# Patient Record
Sex: MALE | Race: WHITE | Employment: FULL TIME | URBAN - METROPOLITAN AREA
[De-identification: names, ages, dates, MRNs, and addresses within clinical notes are randomized per-mention and may not be internally consistent; named-entity substitution may affect disease eponyms.]

---

## 2017-03-15 ENCOUNTER — HOSPITAL ENCOUNTER (EMERGENCY)
Age: 37
Discharge: HOME | End: 2017-03-15
Attending: EMERGENCY MEDICINE
Payer: COMMERCIAL

## 2017-03-15 ENCOUNTER — APPOINTMENT (OUTPATIENT)
Dept: GENERAL RADIOLOGY | Age: 37
End: 2017-03-15
Payer: COMMERCIAL

## 2017-03-15 VITALS
DIASTOLIC BLOOD PRESSURE: 78 MMHG | TEMPERATURE: 98.8 F | OXYGEN SATURATION: 97 % | WEIGHT: 181 LBS | RESPIRATION RATE: 16 BRPM | BODY MASS INDEX: 25.34 KG/M2 | SYSTOLIC BLOOD PRESSURE: 132 MMHG | HEIGHT: 71 IN | HEART RATE: 81 BPM

## 2017-03-15 DIAGNOSIS — R07.9 CHEST PAIN, UNSPECIFIED TYPE: Primary | ICD-10-CM

## 2017-03-15 LAB
ABSOLUTE EOS #: 0 K/UL (ref 0–0.4)
ABSOLUTE LYMPH #: 1.1 K/UL (ref 1–4.8)
ABSOLUTE MONO #: 0.4 K/UL (ref 0.1–1.3)
ALBUMIN SERPL-MCNC: 4.7 G/DL (ref 3.5–5.2)
ALBUMIN/GLOBULIN RATIO: ABNORMAL (ref 1–2.5)
ALP BLD-CCNC: 80 U/L (ref 40–129)
ALT SERPL-CCNC: 17 U/L (ref 5–41)
ANION GAP SERPL CALCULATED.3IONS-SCNC: 16 MMOL/L (ref 9–17)
AST SERPL-CCNC: 17 U/L
BASOPHILS # BLD: 0 % (ref 0–2)
BASOPHILS ABSOLUTE: 0 K/UL (ref 0–0.2)
BILIRUB SERPL-MCNC: 0.68 MG/DL (ref 0.3–1.2)
BUN BLDV-MCNC: 7 MG/DL (ref 6–20)
BUN/CREAT BLD: ABNORMAL (ref 9–20)
CALCIUM SERPL-MCNC: 9.8 MG/DL (ref 8.6–10.4)
CHLORIDE BLD-SCNC: 103 MMOL/L (ref 98–107)
CO2: 23 MMOL/L (ref 20–31)
CREAT SERPL-MCNC: 0.68 MG/DL (ref 0.7–1.2)
DIFFERENTIAL TYPE: ABNORMAL
EOSINOPHILS RELATIVE PERCENT: 0 % (ref 0–4)
GFR AFRICAN AMERICAN: >60 ML/MIN
GFR NON-AFRICAN AMERICAN: >60 ML/MIN
GFR SERPL CREATININE-BSD FRML MDRD: ABNORMAL ML/MIN/{1.73_M2}
GFR SERPL CREATININE-BSD FRML MDRD: ABNORMAL ML/MIN/{1.73_M2}
GLUCOSE BLD-MCNC: 86 MG/DL (ref 70–99)
HCT VFR BLD CALC: 52.5 % (ref 41–53)
HEMOGLOBIN: 18 G/DL (ref 13.5–17.5)
LIPASE: 46 U/L (ref 13–60)
LYMPHOCYTES # BLD: 9 % (ref 24–44)
MCH RBC QN AUTO: 28.5 PG (ref 26–34)
MCHC RBC AUTO-ENTMCNC: 34.3 G/DL (ref 31–37)
MCV RBC AUTO: 83.2 FL (ref 80–100)
MONOCYTES # BLD: 3 % (ref 1–7)
PDW BLD-RTO: 14.3 % (ref 11.5–14.9)
PLATELET # BLD: 245 K/UL (ref 150–450)
PLATELET ESTIMATE: ABNORMAL
PMV BLD AUTO: 8.1 FL (ref 6–12)
POTASSIUM SERPL-SCNC: 3.9 MMOL/L (ref 3.7–5.3)
RBC # BLD: 6.31 M/UL (ref 4.5–5.9)
RBC # BLD: ABNORMAL 10*6/UL
SEG NEUTROPHILS: 88 % (ref 36–66)
SEGMENTED NEUTROPHILS ABSOLUTE COUNT: 11 K/UL (ref 1.3–9.1)
SODIUM BLD-SCNC: 142 MMOL/L (ref 135–144)
TOTAL PROTEIN: 7.6 G/DL (ref 6.4–8.3)
TROPONIN INTERP: NORMAL
TROPONIN T: <0.03 NG/ML
WBC # BLD: 12.6 K/UL (ref 3.5–11)
WBC # BLD: ABNORMAL 10*3/UL

## 2017-03-15 PROCEDURE — 83690 ASSAY OF LIPASE: CPT

## 2017-03-15 PROCEDURE — 84484 ASSAY OF TROPONIN QUANT: CPT

## 2017-03-15 PROCEDURE — 99285 EMERGENCY DEPT VISIT HI MDM: CPT

## 2017-03-15 PROCEDURE — 71020 XR CHEST STANDARD TWO VW: CPT

## 2017-03-15 PROCEDURE — 85025 COMPLETE CBC W/AUTO DIFF WBC: CPT

## 2017-03-15 PROCEDURE — 36415 COLL VENOUS BLD VENIPUNCTURE: CPT

## 2017-03-15 PROCEDURE — 93005 ELECTROCARDIOGRAM TRACING: CPT

## 2017-03-15 PROCEDURE — 80053 COMPREHEN METABOLIC PANEL: CPT

## 2017-03-15 RX ORDER — OMEPRAZOLE 20 MG/1
20 CAPSULE, DELAYED RELEASE ORAL DAILY
Qty: 14 CAPSULE | Refills: 0 | Status: SHIPPED | OUTPATIENT
Start: 2017-03-15 | End: 2020-11-16

## 2017-03-15 ASSESSMENT — ENCOUNTER SYMPTOMS
COUGH: 0
RESPIRATORY NEGATIVE: 1
ABDOMINAL PAIN: 0
GASTROINTESTINAL NEGATIVE: 1
EYES NEGATIVE: 1
BACK PAIN: 0
SHORTNESS OF BREATH: 0

## 2017-03-16 LAB
EKG ATRIAL RATE: 83 BPM
EKG P AXIS: 39 DEGREES
EKG P-R INTERVAL: 180 MS
EKG Q-T INTERVAL: 366 MS
EKG QRS DURATION: 106 MS
EKG QTC CALCULATION (BAZETT): 430 MS
EKG R AXIS: 76 DEGREES
EKG T AXIS: 78 DEGREES
EKG VENTRICULAR RATE: 83 BPM

## 2019-09-05 ENCOUNTER — APPOINTMENT (OUTPATIENT)
Dept: GENERAL RADIOLOGY | Age: 39
End: 2019-09-05
Payer: COMMERCIAL

## 2019-09-05 ENCOUNTER — HOSPITAL ENCOUNTER (EMERGENCY)
Age: 39
Discharge: HOME | End: 2019-09-05
Attending: EMERGENCY MEDICINE
Payer: COMMERCIAL

## 2019-09-05 VITALS
HEART RATE: 100 BPM | HEIGHT: 71 IN | BODY MASS INDEX: 21 KG/M2 | DIASTOLIC BLOOD PRESSURE: 85 MMHG | OXYGEN SATURATION: 94 % | WEIGHT: 150 LBS | SYSTOLIC BLOOD PRESSURE: 119 MMHG | RESPIRATION RATE: 16 BRPM | TEMPERATURE: 98.8 F

## 2019-09-05 DIAGNOSIS — J40 BRONCHITIS: Primary | ICD-10-CM

## 2019-09-05 PROCEDURE — 99283 EMERGENCY DEPT VISIT LOW MDM: CPT

## 2019-09-05 PROCEDURE — 94640 AIRWAY INHALATION TREATMENT: CPT

## 2019-09-05 PROCEDURE — 6370000000 HC RX 637 (ALT 250 FOR IP): Performed by: EMERGENCY MEDICINE

## 2019-09-05 PROCEDURE — 71046 X-RAY EXAM CHEST 2 VIEWS: CPT

## 2019-09-05 PROCEDURE — 94761 N-INVAS EAR/PLS OXIMETRY MLT: CPT

## 2019-09-05 RX ORDER — IPRATROPIUM BROMIDE AND ALBUTEROL SULFATE 2.5; .5 MG/3ML; MG/3ML
1 SOLUTION RESPIRATORY (INHALATION) PRN
Status: DISCONTINUED | OUTPATIENT
Start: 2019-09-05 | End: 2019-09-05 | Stop reason: HOSPADM

## 2019-09-05 RX ORDER — ALBUTEROL SULFATE 90 UG/1
2 AEROSOL, METERED RESPIRATORY (INHALATION) EVERY 4 HOURS PRN
Qty: 1 INHALER | Refills: 0 | Status: SHIPPED | OUTPATIENT
Start: 2019-09-05 | End: 2020-11-16

## 2019-09-05 RX ORDER — BENZONATATE 100 MG/1
100 CAPSULE ORAL 3 TIMES DAILY PRN
Qty: 30 CAPSULE | Refills: 0 | Status: SHIPPED | OUTPATIENT
Start: 2019-09-05 | End: 2019-09-15

## 2019-09-05 RX ORDER — BENZONATATE 100 MG/1
100 CAPSULE ORAL ONCE
Status: COMPLETED | OUTPATIENT
Start: 2019-09-05 | End: 2019-09-05

## 2019-09-05 RX ADMIN — BENZONATATE 100 MG: 100 CAPSULE ORAL at 20:11

## 2019-09-05 RX ADMIN — IPRATROPIUM BROMIDE AND ALBUTEROL SULFATE 1 AMPULE: .5; 3 SOLUTION RESPIRATORY (INHALATION) at 20:10

## 2019-09-05 ASSESSMENT — ENCOUNTER SYMPTOMS
COUGH: 1
RHINORRHEA: 1
COLOR CHANGE: 0
CHEST TIGHTNESS: 1
TROUBLE SWALLOWING: 0
BACK PAIN: 0
SORE THROAT: 0
NAUSEA: 0
EYE PAIN: 0
ABDOMINAL PAIN: 0
EYE DISCHARGE: 0
DIARRHEA: 0
SINUS PRESSURE: 0
CONSTIPATION: 0
VOMITING: 0
EYE REDNESS: 0
BLOOD IN STOOL: 0
WHEEZING: 0
FACIAL SWELLING: 0
SHORTNESS OF BREATH: 0

## 2019-09-06 NOTE — ED PROVIDER NOTES
[address]  eMERGENCY dEPARTMENT eNCOUnter      Pt Name: Kristin Cox  MRN: [id number]  [place] 1980  Date of evaluation: 9/5/19      CHIEF COMPLAINT       Chief Complaint   Patient presents with    Cough    Nasal Congestion         HISTORY OF PRESENT ILLNESS    Kristin Cox is a 43 y.o. male who presents complaining of cough. Patient states for the last 5 days has had cough with nasal congestion and runny nose. Patient states his cough is productive of yellow to greenish sputum. Patient has had no fevers at home. Patient states that when he coughs really hard he does get some pain in his lower sternal area. Patient has no abdominal pain nausea vomiting or diarrhea. Patient has had no pain or swelling in the legs. Patient is a smoker but has no history of asthma or COPD. Patient has had no recent ill contacts. REVIEW OF SYSTEMS       Review of Systems   Constitutional: Negative for activity change, appetite change, chills, diaphoresis and fever. HENT: Positive for congestion and rhinorrhea. Negative for ear pain, facial swelling, nosebleeds, sinus pressure, sore throat and trouble swallowing. Eyes: Negative for pain, discharge and redness. Respiratory: Positive for cough and chest tightness. Negative for shortness of breath and wheezing. Cardiovascular: Positive for chest pain. Negative for palpitations and leg swelling. Gastrointestinal: Negative for abdominal pain, blood in stool, constipation, diarrhea, nausea and vomiting. Genitourinary: Negative for difficulty urinating, dysuria, flank pain, frequency, genital sores and hematuria. Musculoskeletal: Negative for arthralgias, back pain, gait problem, joint swelling, myalgias and neck pain. Skin: Negative for color change, pallor, rash and wound. Neurological: Negative for dizziness, tremors, seizures, syncope, speech difficulty, weakness, numbness and headaches.    Psychiatric/Behavioral: Negative for confusion, decreased concentration, hallucinations, self-injury, sleep disturbance and suicidal ideas. PAST MEDICAL HISTORY   History reviewed. No pertinent past medical history. SURGICAL HISTORY     History reviewed. No pertinent surgical history. CURRENT MEDICATIONS       Previous Medications    OMEPRAZOLE (PRILOSEC) 20 MG DELAYED RELEASE CAPSULE    Take 1 capsule by mouth daily       ALLERGIES     has No Known Allergies. SOCIAL HISTORY      reports that he has been smoking cigars. He has been smoking about 1.00 pack per day. He has never used smokeless tobacco.    PHYSICAL EXAM     INITIAL VITALS: /85   Pulse 100   Temp 98.8 °F (37.1 °C) (Oral)   Resp 16   Ht 5' 11\" (1.803 m)   Wt 150 lb (68 kg)   SpO2 94%   BMI 20.92 kg/m²      Physical Exam   Constitutional: He is oriented to person, place, and time. He appears well-developed and well-nourished. No distress. HENT:   Head: Normocephalic and atraumatic. Eyes: Pupils are equal, round, and reactive to light. Conjunctivae and EOM are normal. Right eye exhibits no discharge. Left eye exhibits no discharge. No scleral icterus. Cardiovascular: Normal rate, regular rhythm and normal heart sounds. Exam reveals no gallop and no friction rub. No murmur heard. Pulmonary/Chest: Effort normal. No respiratory distress. He has wheezes. He has rhonchi. He has no rales. He exhibits no tenderness. Abdominal: Soft. Bowel sounds are normal. He exhibits no distension and no mass. There is no tenderness. There is no rebound and no guarding. Musculoskeletal: Normal range of motion. He exhibits no edema or tenderness. Neurological: He is alert and oriented to person, place, and time. He displays normal reflexes. No cranial nerve deficit or sensory deficit. He exhibits normal muscle tone. Coordination normal.   Skin: Skin is warm and dry. No rash noted. He is not diaphoretic. No erythema. No pallor.    Psychiatric: He has a normal mood and MCG/ACT inhaler     Sig: Inhale 2 puffs into the lungs every 4 hours as needed for Wheezing or Shortness of Breath (Space out to every 6 hours as symptoms improve) Space out to every 6 hours as symptoms improve. Dispense:  1 Inhaler     Refill:  0       -------------------------  9:07 PM  Patient was reevaluated and updated. We will put him on an inhaler but no steroids as he does not feel like the treatment helped him much. I think this is most likely viral so we will not put him on antibiotics at this time. CONSULTS:  None    PROCEDURES:  None    FINAL IMPRESSION      1. Bronchitis          DISPOSITION/PLAN   DISPOSITION Decision To Discharge 09/05/2019 09:06:52 PM      PATIENT REFERREDTO:  Jayesh Lima MD  [address] [place] [address]  [phone]    Schedule an appointment as soon as possible for a visit in 1 week  Follow up within 1 week, Return to ED sooner if symptoms worsen,     [facility] ED  [place] [address]  [phone]    If symptoms worsen      DISCHARGEMEDICATIONS:  New Prescriptions    ALBUTEROL SULFATE HFA (PROVENTIL HFA) 108 (90 BASE) MCG/ACT INHALER    Inhale 2 puffs into the lungs every 4 hours as needed for Wheezing or Shortness of Breath (Space out to every 6 hours as symptoms improve) Space out to every 6 hours as symptoms improve.     BENZONATATE (TESSALON PERLES) 100 MG CAPSULE    Take 1 capsule by mouth 3 times daily as needed for Cough       (Please note that portions of this note were completed with a voice recognition program.  Efforts were made to edit thedictations but occasionally words are mis-transcribed.)    Jackelyn Moses MD  Attending Emergency Physician                        Jackelyn Moses MD  09/05/19 2098

## 2020-11-17 PROBLEM — M54.42 CHRONIC LEFT-SIDED LOW BACK PAIN WITH LEFT-SIDED SCIATICA: Status: ACTIVE | Noted: 2020-11-17

## 2020-11-17 PROBLEM — Z87.19 HISTORY OF BARRETT'S ESOPHAGUS: Status: ACTIVE | Noted: 2020-11-17

## 2020-11-17 PROBLEM — G89.29 CHRONIC LEFT-SIDED LOW BACK PAIN WITH LEFT-SIDED SCIATICA: Status: ACTIVE | Noted: 2020-11-17

## 2020-11-17 PROBLEM — F17.200 HEAVY SMOKER: Status: ACTIVE | Noted: 2020-11-17

## 2020-11-17 PROBLEM — Z98.890: Status: ACTIVE | Noted: 2020-11-17

## 2021-01-24 PROBLEM — M48.061 SPINAL STENOSIS OF LUMBAR REGION: Status: ACTIVE | Noted: 2021-01-24

## 2021-01-24 PROBLEM — M51.36 LUMBAR DEGENERATIVE DISC DISEASE: Status: ACTIVE | Noted: 2021-01-24

## 2021-01-24 PROBLEM — M46.1 SACROILIITIS, NOT ELSEWHERE CLASSIFIED (HCC): Status: ACTIVE | Noted: 2021-01-24

## 2021-01-24 PROBLEM — M51.36 ANNULAR TEAR OF LUMBAR DISC: Status: ACTIVE | Noted: 2021-01-24

## 2021-02-01 ENCOUNTER — HOSPITAL ENCOUNTER (OUTPATIENT)
Age: 41
Setting detail: SPECIMEN
Discharge: HOME | End: 2021-02-01
Payer: COMMERCIAL

## 2021-02-01 DIAGNOSIS — M48.061 SPINAL STENOSIS OF LUMBAR REGION, UNSPECIFIED WHETHER NEUROGENIC CLAUDICATION PRESENT: ICD-10-CM

## 2021-02-01 DIAGNOSIS — G89.29 CHRONIC LEFT-SIDED LOW BACK PAIN WITH LEFT-SIDED SCIATICA: ICD-10-CM

## 2021-02-01 DIAGNOSIS — M54.42 CHRONIC LEFT-SIDED LOW BACK PAIN WITH LEFT-SIDED SCIATICA: ICD-10-CM

## 2021-02-01 DIAGNOSIS — M51.36 LUMBAR DEGENERATIVE DISC DISEASE: ICD-10-CM

## 2021-02-01 LAB
ALBUMIN SERPL-MCNC: 4.2 G/DL (ref 3.5–5.2)
ALBUMIN/GLOBULIN RATIO: 1.6 (ref 1–2.5)
ALP BLD-CCNC: 67 U/L (ref 40–129)
ALT SERPL-CCNC: 18 U/L (ref 5–41)
ANION GAP SERPL CALCULATED.3IONS-SCNC: 10 MMOL/L (ref 9–17)
AST SERPL-CCNC: 17 U/L
BILIRUB SERPL-MCNC: 0.25 MG/DL (ref 0.3–1.2)
BUN BLDV-MCNC: 11 MG/DL (ref 6–20)
BUN/CREAT BLD: ABNORMAL (ref 9–20)
CALCIUM SERPL-MCNC: 8.9 MG/DL (ref 8.6–10.4)
CHLORIDE BLD-SCNC: 107 MMOL/L (ref 98–107)
CO2: 25 MMOL/L (ref 20–31)
CREAT SERPL-MCNC: 0.84 MG/DL (ref 0.7–1.2)
GFR AFRICAN AMERICAN: >60 ML/MIN
GFR NON-AFRICAN AMERICAN: >60 ML/MIN
GFR SERPL CREATININE-BSD FRML MDRD: ABNORMAL ML/MIN/{1.73_M2}
GFR SERPL CREATININE-BSD FRML MDRD: ABNORMAL ML/MIN/{1.73_M2}
GLUCOSE BLD-MCNC: 121 MG/DL (ref 70–99)
POTASSIUM SERPL-SCNC: 4.2 MMOL/L (ref 3.7–5.3)
SODIUM BLD-SCNC: 142 MMOL/L (ref 135–144)
TOTAL PROTEIN: 6.8 G/DL (ref 6.4–8.3)

## 2021-04-26 ENCOUNTER — HOSPITAL ENCOUNTER (OUTPATIENT)
Age: 41
Setting detail: SPECIMEN
Discharge: HOME | End: 2021-04-26
Payer: COMMERCIAL

## 2021-04-26 DIAGNOSIS — G89.29 CHRONIC LEFT-SIDED LOW BACK PAIN WITH LEFT-SIDED SCIATICA: ICD-10-CM

## 2021-04-26 DIAGNOSIS — M54.42 CHRONIC LEFT-SIDED LOW BACK PAIN WITH LEFT-SIDED SCIATICA: ICD-10-CM

## 2021-04-26 DIAGNOSIS — R52 PAIN MANAGEMENT: ICD-10-CM

## 2021-04-26 DIAGNOSIS — M51.36 ANNULAR TEAR OF LUMBAR DISC: ICD-10-CM

## 2021-04-26 DIAGNOSIS — M51.36 LUMBAR DEGENERATIVE DISC DISEASE: ICD-10-CM

## 2021-04-26 LAB
AMPHETAMINE SCREEN URINE: NEGATIVE
BARBITURATE SCREEN URINE: NEGATIVE
BENZODIAZEPINE SCREEN, URINE: NEGATIVE
BUPRENORPHINE URINE: ABNORMAL
CANNABINOID SCREEN URINE: NEGATIVE
COCAINE METABOLITE, URINE: NEGATIVE
MDMA URINE: ABNORMAL
METHADONE SCREEN, URINE: POSITIVE
METHAMPHETAMINE, URINE: ABNORMAL
OPIATES, URINE: NEGATIVE
OXYCODONE SCREEN URINE: NEGATIVE
PHENCYCLIDINE, URINE: NEGATIVE
PROPOXYPHENE, URINE: ABNORMAL
TEST INFORMATION: ABNORMAL
TRICYCLIC ANTIDEPRESSANTS, UR: ABNORMAL

## 2021-08-11 ENCOUNTER — HOSPITAL ENCOUNTER (OUTPATIENT)
Age: 41
Setting detail: SPECIMEN
Discharge: HOME | End: 2021-08-11
Payer: COMMERCIAL

## 2021-08-11 DIAGNOSIS — G89.29 CHRONIC LEFT-SIDED LOW BACK PAIN WITH LEFT-SIDED SCIATICA: ICD-10-CM

## 2021-08-11 DIAGNOSIS — M51.36 LUMBAR DEGENERATIVE DISC DISEASE: ICD-10-CM

## 2021-08-11 DIAGNOSIS — M54.42 CHRONIC LEFT-SIDED LOW BACK PAIN WITH LEFT-SIDED SCIATICA: ICD-10-CM

## 2021-08-11 DIAGNOSIS — M48.061 SPINAL STENOSIS OF LUMBAR REGION, UNSPECIFIED WHETHER NEUROGENIC CLAUDICATION PRESENT: ICD-10-CM

## 2021-08-15 LAB
6-ACETYLMORPHINE, UR: NOT DETECTED
7-AMINOCLONAZEPAM, URINE: NOT DETECTED
ALPHA-OH-ALPRAZ, URINE: NOT DETECTED
ALPHA-OH-MIDAZOLAM, URINE: NOT DETECTED
ALPRAZOLAM, URINE: NOT DETECTED
AMPHETAMINES, URINE: NOT DETECTED
BARBITURATES, URINE: NOT DETECTED
BENZOYLECGONINE, UR: NOT DETECTED
BUPRENORPHINE URINE: NOT DETECTED
CARISOPRODOL, UR: NOT DETECTED
CLONAZEPAM, URINE: NOT DETECTED
CODEINE, URINE: NOT DETECTED
CREATININE URINE: 149.4 MG/DL (ref 20–400)
DIAZEPAM, URINE: NOT DETECTED
DRUGS EXPECTED, UR: NORMAL
EER HI RES INTERP UR: NORMAL
ETHYL GLUCURONIDE UR: NOT DETECTED
FENTANYL URINE: NOT DETECTED
GABAPENTIN: NOT DETECTED
HYDROCODONE, URINE: NOT DETECTED
HYDROMORPHONE, URINE: NOT DETECTED
LORAZEPAM, URINE: NOT DETECTED
MARIJUANA METAB, UR: NOT DETECTED
MDA, UR: NOT DETECTED
MDEA, EVE, UR: NOT DETECTED
MDMA URINE: NOT DETECTED
MEPERIDINE METAB, UR: NOT DETECTED
METHADONE, URINE: NOT DETECTED
METHAMPHETAMINE, URINE: NOT DETECTED
METHYLPHENIDATE: NOT DETECTED
MIDAZOLAM, URINE: NOT DETECTED
MORPHINE URINE: NOT DETECTED
NALOXONE URINE: NOT DETECTED
NORBUPRENORPHINE, URINE: NOT DETECTED
NORDIAZEPAM, URINE: NOT DETECTED
NORFENTANYL, URINE: NOT DETECTED
NORHYDROCODONE, URINE: NOT DETECTED
NOROXYCODONE, URINE: NOT DETECTED
NOROXYMORPHONE, URINE: NOT DETECTED
OXAZEPAM, URINE: NOT DETECTED
OXYCODONE URINE: NOT DETECTED
OXYMORPHONE, URINE: NOT DETECTED
PAIN MANAGEMENT DRUG PANEL INTERP, URINE: NORMAL
PAIN MGT DRUG PANEL, HI RES, UR: NORMAL
PCP,URINE: NOT DETECTED
PHENTERMINE, UR: NOT DETECTED
PREGABALIN: NOT DETECTED
TAPENTADOL, URINE: NOT DETECTED
TAPENTADOL-O-SULFATE, URINE: NOT DETECTED
TEMAZEPAM, URINE: NOT DETECTED
TRAMADOL, URINE: NOT DETECTED
ZOLPIDEM METABOLITE (ZCA), URINE: NOT DETECTED
ZOLPIDEM, URINE: NOT DETECTED

## 2022-03-06 ENCOUNTER — HOSPITAL ENCOUNTER (INPATIENT)
Age: 42
LOS: 1 days | Discharge: HOME | DRG: 918 | End: 2022-03-08
Attending: EMERGENCY MEDICINE | Admitting: INTERNAL MEDICINE
Payer: COMMERCIAL

## 2022-03-06 DIAGNOSIS — T39.1X2A INTENTIONAL ACETAMINOPHEN OVERDOSE, INITIAL ENCOUNTER (HCC): Primary | ICD-10-CM

## 2022-03-06 PROCEDURE — 80179 DRUG ASSAY SALICYLATE: CPT

## 2022-03-06 PROCEDURE — 36415 COLL VENOUS BLD VENIPUNCTURE: CPT

## 2022-03-06 PROCEDURE — 80048 BASIC METABOLIC PNL TOTAL CA: CPT

## 2022-03-06 PROCEDURE — 96365 THER/PROPH/DIAG IV INF INIT: CPT

## 2022-03-06 PROCEDURE — 80076 HEPATIC FUNCTION PANEL: CPT

## 2022-03-06 PROCEDURE — G0480 DRUG TEST DEF 1-7 CLASSES: HCPCS

## 2022-03-06 PROCEDURE — 99285 EMERGENCY DEPT VISIT HI MDM: CPT

## 2022-03-06 PROCEDURE — 93005 ELECTROCARDIOGRAM TRACING: CPT | Performed by: EMERGENCY MEDICINE

## 2022-03-06 PROCEDURE — 80143 DRUG ASSAY ACETAMINOPHEN: CPT

## 2022-03-06 RX ORDER — 0.9 % SODIUM CHLORIDE 0.9 %
1000 INTRAVENOUS SOLUTION INTRAVENOUS ONCE
Status: COMPLETED | OUTPATIENT
Start: 2022-03-06 | End: 2022-03-07

## 2022-03-06 ASSESSMENT — PAIN - FUNCTIONAL ASSESSMENT: PAIN_FUNCTIONAL_ASSESSMENT: 0-10

## 2022-03-06 ASSESSMENT — PAIN SCALES - GENERAL: PAINLEVEL_OUTOF10: 5

## 2022-03-06 NOTE — LETTER
[address] [facility] [address]  Phone: [facility], [place]      March 8, 2022     Patient: Mera Platt   YOB: 1980   Date of Visit: 3/6/2022       To Whom It May Concern: It is my medical opinion that Matt Bingham may return to full duty immediately with no restrictions on 3/9/2022. If you have any questions or concerns, please don't hesitate to call.     Sincerely,        Kina Vogel MD

## 2022-03-07 PROBLEM — T39.1X2A TYLENOL OVERDOSE, INTENTIONAL SELF-HARM, INITIAL ENCOUNTER (HCC): Status: ACTIVE | Noted: 2022-03-07

## 2022-03-07 PROBLEM — F32.A DEPRESSION: Status: ACTIVE | Noted: 2022-03-07

## 2022-03-07 LAB
ABSOLUTE EOS #: 0 K/UL (ref 0–0.4)
ABSOLUTE EOS #: 0 K/UL (ref 0–0.4)
ABSOLUTE LYMPH #: 1.9 K/UL (ref 1–4.8)
ABSOLUTE LYMPH #: 2.5 K/UL (ref 1–4.8)
ABSOLUTE MONO #: 0.7 K/UL (ref 0.1–1.3)
ABSOLUTE MONO #: 0.8 K/UL (ref 0.1–1.3)
ACETAMINOPHEN LEVEL: 6 UG/ML (ref 10–30)
ACETAMINOPHEN LEVEL: <5 UG/ML (ref 10–30)
ALBUMIN SERPL-MCNC: 3.8 G/DL (ref 3.5–5.2)
ALBUMIN SERPL-MCNC: 4.3 G/DL (ref 3.5–5.2)
ALP BLD-CCNC: 59 U/L (ref 40–129)
ALP BLD-CCNC: 72 U/L (ref 40–129)
ALT SERPL-CCNC: 42 U/L (ref 5–41)
ALT SERPL-CCNC: 42 U/L (ref 5–41)
ALT SERPL-CCNC: 48 U/L (ref 5–41)
ANION GAP SERPL CALCULATED.3IONS-SCNC: 10 MMOL/L (ref 9–17)
ANION GAP SERPL CALCULATED.3IONS-SCNC: 13 MMOL/L (ref 9–17)
AST SERPL-CCNC: 21 U/L
AST SERPL-CCNC: 25 U/L
AST SERPL-CCNC: 27 U/L
BASOPHILS # BLD: 0 % (ref 0–2)
BASOPHILS # BLD: 0 % (ref 0–2)
BASOPHILS ABSOLUTE: 0 K/UL (ref 0–0.2)
BASOPHILS ABSOLUTE: 0.1 K/UL (ref 0–0.2)
BILIRUB SERPL-MCNC: 0.35 MG/DL (ref 0.3–1.2)
BILIRUB SERPL-MCNC: 0.36 MG/DL (ref 0.3–1.2)
BILIRUBIN DIRECT: 0.11 MG/DL
BILIRUBIN, INDIRECT: 0.25 MG/DL (ref 0–1)
BUN BLDV-MCNC: 10 MG/DL (ref 6–20)
BUN BLDV-MCNC: 10 MG/DL (ref 6–20)
CALCIUM SERPL-MCNC: 8.2 MG/DL (ref 8.6–10.4)
CALCIUM SERPL-MCNC: 8.8 MG/DL (ref 8.6–10.4)
CHLORIDE BLD-SCNC: 106 MMOL/L (ref 98–107)
CHLORIDE BLD-SCNC: 108 MMOL/L (ref 98–107)
CO2: 20 MMOL/L (ref 20–31)
CO2: 21 MMOL/L (ref 20–31)
CREAT SERPL-MCNC: 0.58 MG/DL (ref 0.7–1.2)
CREAT SERPL-MCNC: 0.74 MG/DL (ref 0.7–1.2)
EKG ATRIAL RATE: 94 BPM
EKG P AXIS: 60 DEGREES
EKG P-R INTERVAL: 160 MS
EKG Q-T INTERVAL: 352 MS
EKG QRS DURATION: 104 MS
EKG QTC CALCULATION (BAZETT): 440 MS
EKG R AXIS: 65 DEGREES
EKG T AXIS: 67 DEGREES
EKG VENTRICULAR RATE: 94 BPM
EOSINOPHILS RELATIVE PERCENT: 0 % (ref 0–4)
EOSINOPHILS RELATIVE PERCENT: 0 % (ref 0–4)
ETHANOL PERCENT: <0.01 %
ETHANOL: <10 MG/DL
GFR AFRICAN AMERICAN: >60 ML/MIN
GFR AFRICAN AMERICAN: >60 ML/MIN
GFR NON-AFRICAN AMERICAN: >60 ML/MIN
GFR NON-AFRICAN AMERICAN: >60 ML/MIN
GFR SERPL CREATININE-BSD FRML MDRD: ABNORMAL ML/MIN/{1.73_M2}
GFR SERPL CREATININE-BSD FRML MDRD: NORMAL ML/MIN/{1.73_M2}
GLUCOSE BLD-MCNC: 126 MG/DL (ref 70–99)
GLUCOSE BLD-MCNC: 75 MG/DL (ref 70–99)
HCT VFR BLD CALC: 47.5 % (ref 41–53)
HCT VFR BLD CALC: 51.3 % (ref 41–53)
HEMOGLOBIN: 16.2 G/DL (ref 13.5–17.5)
HEMOGLOBIN: 17.4 G/DL (ref 13.5–17.5)
LYMPHOCYTES # BLD: 15 % (ref 24–44)
LYMPHOCYTES # BLD: 17 % (ref 24–44)
MAGNESIUM: 1.9 MG/DL (ref 1.6–2.6)
MAGNESIUM: 2 MG/DL (ref 1.6–2.6)
MCH RBC QN AUTO: 28.9 PG (ref 26–34)
MCH RBC QN AUTO: 29.2 PG (ref 26–34)
MCHC RBC AUTO-ENTMCNC: 33.9 G/DL (ref 31–37)
MCHC RBC AUTO-ENTMCNC: 34.1 G/DL (ref 31–37)
MCV RBC AUTO: 85.2 FL (ref 80–100)
MCV RBC AUTO: 85.5 FL (ref 80–100)
MONOCYTES # BLD: 5 % (ref 1–7)
MONOCYTES # BLD: 6 % (ref 1–7)
PDW BLD-RTO: 13.8 % (ref 11.5–14.9)
PDW BLD-RTO: 14.1 % (ref 11.5–14.9)
PLATELET # BLD: 231 K/UL (ref 150–450)
PLATELET # BLD: 261 K/UL (ref 150–450)
PMV BLD AUTO: 6.7 FL (ref 6–12)
PMV BLD AUTO: 7 FL (ref 6–12)
POTASSIUM SERPL-SCNC: 3.4 MMOL/L (ref 3.7–5.3)
POTASSIUM SERPL-SCNC: 4 MMOL/L (ref 3.7–5.3)
RBC # BLD: 5.56 M/UL (ref 4.5–5.9)
RBC # BLD: 6.02 M/UL (ref 4.5–5.9)
SALICYLATE LEVEL: <1 MG/DL (ref 3–10)
SARS-COV-2, RAPID: NOT DETECTED
SEG NEUTROPHILS: 78 % (ref 36–66)
SEG NEUTROPHILS: 79 % (ref 36–66)
SEGMENTED NEUTROPHILS ABSOLUTE COUNT: 10.1 K/UL (ref 1.3–9.1)
SEGMENTED NEUTROPHILS ABSOLUTE COUNT: 11.2 K/UL (ref 1.3–9.1)
SODIUM BLD-SCNC: 138 MMOL/L (ref 135–144)
SODIUM BLD-SCNC: 140 MMOL/L (ref 135–144)
SPECIMEN DESCRIPTION: NORMAL
TOTAL PROTEIN: 6 G/DL (ref 6.4–8.3)
TOTAL PROTEIN: 7 G/DL (ref 6.4–8.3)
WBC # BLD: 12.8 K/UL (ref 3.5–11)
WBC # BLD: 14.5 K/UL (ref 3.5–11)

## 2022-03-07 PROCEDURE — 2580000003 HC RX 258: Performed by: EMERGENCY MEDICINE

## 2022-03-07 PROCEDURE — 83735 ASSAY OF MAGNESIUM: CPT

## 2022-03-07 PROCEDURE — 6360000002 HC RX W HCPCS: Performed by: EMERGENCY MEDICINE

## 2022-03-07 PROCEDURE — 99291 CRITICAL CARE FIRST HOUR: CPT | Performed by: INTERNAL MEDICINE

## 2022-03-07 PROCEDURE — 84450 TRANSFERASE (AST) (SGOT): CPT

## 2022-03-07 PROCEDURE — 85025 COMPLETE CBC W/AUTO DIFF WBC: CPT

## 2022-03-07 PROCEDURE — 87635 SARS-COV-2 COVID-19 AMP PRB: CPT

## 2022-03-07 PROCEDURE — 6370000000 HC RX 637 (ALT 250 FOR IP)

## 2022-03-07 PROCEDURE — 2060000000 HC ICU INTERMEDIATE R&B

## 2022-03-07 PROCEDURE — 80053 COMPREHEN METABOLIC PANEL: CPT

## 2022-03-07 PROCEDURE — 80143 DRUG ASSAY ACETAMINOPHEN: CPT

## 2022-03-07 PROCEDURE — 6370000000 HC RX 637 (ALT 250 FOR IP): Performed by: STUDENT IN AN ORGANIZED HEALTH CARE EDUCATION/TRAINING PROGRAM

## 2022-03-07 PROCEDURE — 93010 ELECTROCARDIOGRAM REPORT: CPT | Performed by: INTERNAL MEDICINE

## 2022-03-07 PROCEDURE — 84460 ALANINE AMINO (ALT) (SGPT): CPT

## 2022-03-07 PROCEDURE — 36415 COLL VENOUS BLD VENIPUNCTURE: CPT

## 2022-03-07 RX ORDER — SODIUM CHLORIDE 0.9 % (FLUSH) 0.9 %
5-40 SYRINGE (ML) INJECTION EVERY 12 HOURS SCHEDULED
Status: DISCONTINUED | OUTPATIENT
Start: 2022-03-07 | End: 2022-03-08 | Stop reason: HOSPADM

## 2022-03-07 RX ORDER — POTASSIUM CHLORIDE 20 MEQ/1
40 TABLET, EXTENDED RELEASE ORAL PRN
Status: DISCONTINUED | OUTPATIENT
Start: 2022-03-07 | End: 2022-03-08 | Stop reason: HOSPADM

## 2022-03-07 RX ORDER — SODIUM CHLORIDE 9 MG/ML
25 INJECTION, SOLUTION INTRAVENOUS PRN
Status: DISCONTINUED | OUTPATIENT
Start: 2022-03-07 | End: 2022-03-08 | Stop reason: HOSPADM

## 2022-03-07 RX ORDER — POTASSIUM CHLORIDE 7.45 MG/ML
10 INJECTION INTRAVENOUS PRN
Status: DISCONTINUED | OUTPATIENT
Start: 2022-03-07 | End: 2022-03-08 | Stop reason: HOSPADM

## 2022-03-07 RX ORDER — LIDOCAINE 4 G/G
1 PATCH TOPICAL DAILY
Status: DISCONTINUED | OUTPATIENT
Start: 2022-03-07 | End: 2022-03-08 | Stop reason: HOSPADM

## 2022-03-07 RX ORDER — ONDANSETRON 4 MG/1
4 TABLET, ORALLY DISINTEGRATING ORAL EVERY 8 HOURS PRN
Status: DISCONTINUED | OUTPATIENT
Start: 2022-03-07 | End: 2022-03-08 | Stop reason: HOSPADM

## 2022-03-07 RX ORDER — MAGNESIUM SULFATE 1 G/100ML
1000 INJECTION INTRAVENOUS PRN
Status: DISCONTINUED | OUTPATIENT
Start: 2022-03-07 | End: 2022-03-08 | Stop reason: HOSPADM

## 2022-03-07 RX ORDER — SODIUM CHLORIDE 0.9 % (FLUSH) 0.9 %
5-40 SYRINGE (ML) INJECTION PRN
Status: DISCONTINUED | OUTPATIENT
Start: 2022-03-07 | End: 2022-03-08 | Stop reason: HOSPADM

## 2022-03-07 RX ORDER — NICOTINE 21 MG/24HR
1 PATCH, TRANSDERMAL 24 HOURS TRANSDERMAL DAILY
Status: DISCONTINUED | OUTPATIENT
Start: 2022-03-07 | End: 2022-03-08 | Stop reason: HOSPADM

## 2022-03-07 RX ORDER — ONDANSETRON 2 MG/ML
4 INJECTION INTRAMUSCULAR; INTRAVENOUS EVERY 6 HOURS PRN
Status: DISCONTINUED | OUTPATIENT
Start: 2022-03-07 | End: 2022-03-08 | Stop reason: HOSPADM

## 2022-03-07 RX ADMIN — ACETYLCYSTEINE 10200 MG: 6 INJECTION, SOLUTION INTRAVENOUS at 01:52

## 2022-03-07 RX ADMIN — ACETYLCYSTEINE 6800 MG: 6 INJECTION, SOLUTION INTRAVENOUS at 07:28

## 2022-03-07 RX ADMIN — ACETYLCYSTEINE 3400 MG: 6 INJECTION, SOLUTION INTRAVENOUS at 03:04

## 2022-03-07 RX ADMIN — SODIUM CHLORIDE, PRESERVATIVE FREE 10 ML: 5 INJECTION INTRAVENOUS at 20:52

## 2022-03-07 RX ADMIN — POTASSIUM CHLORIDE 40 MEQ: 1500 TABLET, EXTENDED RELEASE ORAL at 13:28

## 2022-03-07 RX ADMIN — ENOXAPARIN SODIUM 40 MG: 100 INJECTION SUBCUTANEOUS at 09:31

## 2022-03-07 RX ADMIN — SODIUM CHLORIDE, PRESERVATIVE FREE 10 ML: 5 INJECTION INTRAVENOUS at 09:32

## 2022-03-07 RX ADMIN — SODIUM CHLORIDE 1000 ML: 9 INJECTION, SOLUTION INTRAVENOUS at 00:14

## 2022-03-07 ASSESSMENT — PAIN DESCRIPTION - LOCATION: LOCATION: BACK

## 2022-03-07 ASSESSMENT — PAIN SCALES - GENERAL
PAINLEVEL_OUTOF10: 0
PAINLEVEL_OUTOF10: 4
PAINLEVEL_OUTOF10: 0

## 2022-03-07 ASSESSMENT — ENCOUNTER SYMPTOMS
SHORTNESS OF BREATH: 0
COUGH: 0
DIARRHEA: 0
VOMITING: 0
ABDOMINAL DISTENTION: 0
ABDOMINAL PAIN: 0
CHEST TIGHTNESS: 0
BACK PAIN: 1

## 2022-03-07 NOTE — ED PROVIDER NOTES
EMERGENCY DEPARTMENT ENCOUNTER    Pt Name: Katelin Pollock  MRN: [id number]  [place] 1980  Date of evaluation: 3/6/22  CHIEF COMPLAINT       Chief Complaint   Patient presents with    Drug Overdose     HISTORY OF PRESENT ILLNESS   HPI     Patient is a 42year old male who presents with tylenol overdose. Took 7 500mg acetaminophen tablets at midnight today. States he did this because he has a slipped disc and he was tired of living in pain. Stopped his antidepressant medication recently and has been feeling depressed. Denies alcohol use. Denies abdominal pain, nausea, vomiting. REVIEW OF SYSTEMS     Review of Systems   Constitutional: Negative for fever. HENT: Negative for congestion. Respiratory: Negative for cough and shortness of breath. Cardiovascular: Negative for chest pain. Gastrointestinal: Negative for abdominal pain, diarrhea and vomiting. Genitourinary: Negative for dysuria. Musculoskeletal: Positive for back pain. Skin: Negative for rash. Neurological: Negative for headaches. Psychiatric/Behavioral: Positive for dysphoric mood and suicidal ideas. All other systems reviewed and are negative. PASTMEDICAL HISTORY   History reviewed. No pertinent past medical history. SURGICAL HISTORY     History reviewed. No pertinent surgical history. CURRENT MEDICATIONS       Previous Medications    BUPROPION (WELLBUTRIN XL) 300 MG EXTENDED RELEASE TABLET    Take 1 tablet by mouth every morning    BUPROPION (ZYBAN) 150 MG EXTENDED RELEASE TABLET    Take 1 tablet by mouth 2 times daily    PANTOPRAZOLE (PROTONIX) 20 MG TABLET    Take 1 tablet by mouth nightly     ALLERGIES     has No Known Allergies. FAMILY HISTORY     has no family status information on file.       SOCIAL HISTORY       Social History     Tobacco Use    Smoking status: Current Every Day Smoker     Packs/day: 1.00     Years: 5.00     Pack years: 5.00     Types: Cigars, Cigarettes    Smokeless tobacco: Never Used Vaping Use    Vaping Use: Never used   Substance Use Topics    Alcohol use: Yes     Comment: social    Drug use: Never     PHYSICAL EXAM     INITIAL VITALS: /84   Pulse 84   Temp 98.2 °F (36.8 °C) (Oral)   Resp 29   Ht 5' 11\" (1.803 m)   Wt 150 lb (68 kg)   SpO2 95%   BMI 20.92 kg/m²    Physical Exam  Vitals and nursing note reviewed. Constitutional:       General: He is not in acute distress. Appearance: He is well-developed. HENT:      Head: Normocephalic and atraumatic. Eyes:      Conjunctiva/sclera: Conjunctivae normal.   Cardiovascular:      Rate and Rhythm: Normal rate and regular rhythm. Heart sounds: No murmur heard. No friction rub. Pulmonary:      Effort: Pulmonary effort is normal. No respiratory distress. Breath sounds: Normal breath sounds. No stridor. No wheezing or rhonchi. Abdominal:      General: There is no distension. Palpations: Abdomen is soft. Tenderness: There is no abdominal tenderness. There is no guarding or rebound. Musculoskeletal:      Cervical back: Neck supple. Skin:     General: Skin is warm and dry. Capillary Refill: Capillary refill takes less than 2 seconds. Neurological:      Mental Status: He is alert. Psychiatric:      Comments: Flat affect       DIAGNOSTIC RESULTS   RADIOLOGY:All plain film, CT, MRI, and formal ultrasound images (except ED bedside ultrasound) are read by the radiologist, see reports below, unless otherwisenoted in MDM or here. No orders to display     LABS: All lab results were reviewed by myself, and all abnormals are listed below.   Labs Reviewed   CBC WITH AUTO DIFFERENTIAL - Abnormal; Notable for the following components:       Result Value    WBC 14.5 (*)     RBC 6.02 (*)     Seg Neutrophils 78 (*)     Lymphocytes 17 (*)     Segs Absolute 11.20 (*)     All other components within normal limits   ACETAMINOPHEN LEVEL - Abnormal; Notable for the following components:    Acetaminophen Level 6 (*)     All other components within normal limits   SALICYLATE LEVEL - Abnormal; Notable for the following components:    Salicylate Lvl <1 (*)     All other components within normal limits   HEPATIC FUNCTION PANEL - Abnormal; Notable for the following components:    ALT 48 (*)     All other components within normal limits   COVID-19, RAPID   BASIC METABOLIC PANEL   MAGNESIUM   ETHANOL       EMERGENCY DEPARTMENTCOURSE:   Differential diagnosis includes acute toxicity, exacerbation of chronic mental illness, liver failure. Patient reports taking 3500mg of tylenol in a 24 hour period, however will check labs. 1:54 AM EST  Patient has mildly elevated ALT of 48 no other elevation in LFTs. Ethanol salicylate negative. Tylenol level is 6 I did speak to [facility] control about this and they said that because there is still is a detectable Tylenol level after 24 hours she recommends starting the patient on NAC. Will admit the patient for Tylenol overdose I did speak to Dr. Lorena Khan who accepts the patient. EKG: All EKG's are interpreted by the Emergency Department Physician who either signs or Co-signs this chart in the absence of a cardiologist.  Normal sinus rhythm with a heart rate of 94 bpm, normal axis, no prolonged intervals no acute ST or T wave changes    CRITICAL CARE:   CRITICAL CARE: There was a high probability of clinically significant/life threatening deterioration in this patient's condition which required my urgent intervention. Total critical care time was 15 minutes. This excludes any time for separately reportable procedures.        CONSULTS:  IP CONSULT TO FAMILY MEDICINE    Vitals:    Vitals:    03/06/22 2334 03/06/22 2353 03/07/22 0115 03/07/22 0123   BP: (!) 139/111 122/84     Pulse: 100 108 72 84   Resp: 15 12 25 29   Temp: 98.2 °F (36.8 °C)      TempSrc: Oral      SpO2: 97% 98% 95% 95%   Weight: 150 lb (68 kg)      Height: 5' 11\" (1.803 m)          The patient was given the following medications while in the emergency department:  Orders Placed This Encounter   Medications    0.9 % sodium chloride bolus    FOLLOWED BY Linked Order Group     acetylcysteine (ACETADOTE) 10,200 mg in dextrose 5 % 251 mL infusion     acetylcysteine (ACETADOTE) 3,400 mg in dextrose 5 % 500 mL infusion     acetylcysteine (ACETADOTE) 6,800 mg in dextrose 5 % 1,000 mL infusion         FINAL IMPRESSION      1.  Intentional acetaminophen overdose, initial encounter [facility])         Marlys Canseco MD  Attending Emergency Physician    This charting supersedes any ED resident or staff charting and was written using speech recognition Erasto Orellana MD  03/07/22 8456

## 2022-03-07 NOTE — ED TRIAGE NOTES
Mode of arrival (squad #, walk in, police, etc) : walk in        Chief complaint(s): Drug Overdose        Arrival Note (brief scenario, treatment PTA, etc). : pt. Reports he was having back pain due a slipped disc and intentionally took 7 of his 500 mg tylenol around 0000 3/6 (approx 12 hours PTA). Pt. States he has been off his dose of Wellbutrin for approx. 1 week and took the tylenol in a moment of weakness. Pt. Denies any previous attempts to harm himself. Pt. Denies any other complaints at this time. Pt. Denies any current SI or HI.        C= \"Have you ever felt that you should Cut down on your drinking? \"  No  A= \"Have people Annoyed you by criticizing your drinking? \"  No  G= \"Have you ever felt bad or Guilty about your drinking? \"  No  E= \"Have you ever had a drink as an Eye-opener first thing in the morning to steady your nerves or to help a hangover? \"  No      Deferred []      Reason for deferring: N/A    *If yes to two or more: probable alcohol abuse. *

## 2022-03-07 NOTE — PLAN OF CARE
Nutrition Problem #1: Inadequate oral intake  Intervention: Food and/or Nutrient Delivery: Continue Current Diet,Start Oral Nutrition Supplement  Nutritional Goals: po intake greater than 75%

## 2022-03-07 NOTE — PROGRESS NOTES
Comprehensive Nutrition Assessment    Type and Reason for Visit:  Initial,Positive Nutrition Screen (wt loss, poor appetite)    Nutrition Recommendations/Plan:   Will continue to provide Regular diet and add Ensure Enlive twice daily    Nutrition Assessment:  Pt admitted due to OD. He states he's not much of a breakfast eater but his appetite is back to normal. Review of wt history shows no wt loss. He is agreeable to trying Ensure. Malnutrition Assessment:  Malnutrition Status: At risk for malnutrition (Comment)    Context:  Acute Illness     Findings of the 6 clinical characteristics of malnutrition:  Energy Intake:  Mild decrease in energy intake (Comment)  Weight Loss:  No significant weight loss     Body Fat Loss:  No significant body fat loss     Muscle Mass Loss:  No significant muscle mass loss    Fluid Accumulation:  No significant fluid accumulation     Strength:  Not Performed    Estimated Daily Nutrient Needs:  Energy (kcal): Arenac x 1.2= 2000 kcal; Weight Used for Energy Requirements:  Admission     Protein (g):  1.3g/kg= 100 g; Weight Used for Protein Requirements:  Ideal          Nutrition Related Findings:  no edema, Labs/Meds: Reviewed, PMH: None      Wounds:  None       Current Nutrition Therapies:    ADULT DIET; Regular    Anthropometric Measures:  · Height: 5' 11\" (180.3 cm)  · Current Body Weight: 157 lb (71.2 kg)   · Admission Body Weight: 157 lb (71.2 kg)    · Usual Body Weight: 154 lb (69.9 kg) (8/21)     · Ideal Body Weight: 172 lbs; BMI: 21.9  · BMI Categories: Normal Weight (BMI 18.5-24. 9)       Nutrition Diagnosis:   · Inadequate oral intake related to psychological cause or life stress as evidenced by poor intake prior to admission,intake 0-25%    Nutrition Interventions:   Food and/or Nutrient Delivery:  Continue Current Diet,Start Oral Nutrition Supplement  Nutrition Education/Counseling:  Education completed (discussed supplements)   Coordination of Nutrition Care: Continue to monitor while inpatient    Goals:  po intake greater than 75%       Nutrition Monitoring and Evaluation:   Food/Nutrient Intake Outcomes:  Food and Nutrient Intake,Supplement Intake  Physical Signs/Symptoms Outcomes:  Biochemical Data,GI Status,Fluid Status or Edema,Skin,Weight     Discharge Planning:    Continue current diet     Electronically signed by Ely Vásquez RD, LD on 3/7/22 at 12:24 PM EST    Contact: [phone]

## 2022-03-07 NOTE — CARE COORDINATION
CASE MANAGEMENT NOTE:    Admission Date:  3/6/2022 Whitney Espinal is a 42 y.o.  male    Admitted for : Intentional acetaminophen overdose, initial encounter ([facility] [address] [T39.1X2A]  Tylenol overdose, intentional self-harm, [facility] Heide [facility]ers    Met with:  Patient    PCP:  Has no PCP, but would like a PCP with [facility]. Insurance:  BCBS      Is patient alert and oriented at time of discussion:  Yes    Current Residence/ Living Arrangements:  independently at home             Current Services PTA:  Yes, follows with Dr Tanya Pena at [facility] clinic    Does patient go to outpatient dialysis: No  If yes, location and chair time:     Is patient agreeable to VNS: No    Freedom of choice provided:  Yes    List of [address] provided: No    VNS chosen:  No    DME:  none    Home Oxygen: No    Nebulizer: No    CPAP/BIPAP: No    Supplier: N/A    Potential Assistance Needed: Yes, May need BHI     SNF needed: No    Freedom of choice and list provided: NA    [facility]       Does Patient want to use MEDS to BEDS? No    Is patient currently receiving oral anticoagulation therapy? No    Is the Patient an [facility] with Readmission Risk Score greater than 14%? No  If yes, pt needs a follow up appointment made within 7 days. Family Members/Caregivers that pt would like involved in their care:    no    If yes, list name here:  none    Transportation Provider:  Will call someone for a ride             Discharge Plan:  3/7/22 - [employer] - Patient is from home alone, Has no DMe: Denies need for VNS, following with Dr Tanya Pena at [facility] for back pain. Has no PCP and wants to follow [facility], New PCP appt made with Garett Phillips, for 3/21/22 at 12:45p.m.,  [place] header risk 7% Probable discharge to [facility], follow for discharge needs. Will follow . //pf               Electronically signed by: Carlos Hope RN on 3/7/2022 at 2:40 PM

## 2022-03-07 NOTE — PLAN OF CARE
Problem: Suicide risk  Goal: Provide patient with safe environment  Description: Provide patient with safe environment  3/7/2022 0952 by Sanaz Ventura RN  Outcome: Ongoing  3/7/2022 0552 by Caleb Carpenter RN  Outcome: Ongoing     Problem: Falls - Risk of:  Goal: Will remain free from falls  Description: Will remain free from falls  3/7/2022 0952 by Sanaz Ventura RN  Outcome: Ongoing  3/7/2022 0552 by Caleb Carpenter RN  Outcome: Ongoing  Goal: Absence of physical injury  Description: Absence of physical injury  3/7/2022 0952 by Sanaz Ventura RN  Outcome: Ongoing  3/7/2022 0552 by Caleb Carpenter RN  Outcome: Ongoing

## 2022-03-07 NOTE — ED NOTES
Yadira Arthur is a 42year old male who presents to the ED via self. Pt ingested 6-7 500 mg Tylenol at midnight earlier today to help relieve pt's back pain. Pt reports this was not an attempt to kill himself. Pt denies active SI/HI/AH/VVH. When asked about pt answering \"yes\" for all of the suicide screening questions during triage, pt reports \"I don't remember being asked those questions. \" Pt does report on going stress at work that is causing pt some depression but states \"no more then usual.\" Pt denies any previous suicide attempt and psychiatric hospitalizations. Pt stopped taking Wellbutrin after 6 months to a year one week ago. Pt states this was prescribed to pt to help with pt's smoking sensation. Pt denies he use of illegal drugs and occasionaly drinks alcohol. Pt reports good sleep and a normal appetite. Pt does provide this writer with conflicting information. Pt informed ED Dr and RN pt was feeling depressed and having intermittent suicidal ideations. Pt to be reevaluated once pt is medically cleared.

## 2022-03-07 NOTE — ED NOTES
Pt placed on 1:1 supervision. Pt changed out. Pt calm and cooperative.      Lloyd, [facility], [facility]  03/07/22 0029

## 2022-03-07 NOTE — H&P
[address] [place] [address]     HISTORY AND PHYSICAL EXAMINATION            Date:   3/7/2022  Patient name:  Ariel Delgadillo  Date of admission:  3/6/2022 11:33 PM  MRN:   [id number]  Account:  [de-identified]  YOB: 1980  PCP:    No primary care provider on file. Room:   2011/2011-01  Code Status:    Full Code    Chief Complaint:     Chief Complaint   Patient presents with    Drug Overdose       History Obtained From:     patient    History of Present Illness: The patient is a 70-year-old male history of depression and slipped lumbar disc who presents with possible Tylenol overdose. Patient has history of lumbar slipped disc, for which she is scheduled to receive cortisone injection and which has been causing him significant pain over the past couple months. Pain was increasing while the patient was at work as a  on the evening of 3/6, so he took a total of 6-7 500 mg Tylenol tablets over the course of 7 hours. He went home sometime after midnight, and slept until he was awoken by nonradiating right upper quadrant pain, at which point he presented to the [address] [place] [address] ED. Patient does has a history of depression previously treated with Wellbutrin. Patient states that he stopped taking this medication approximately 1 week ago, but denies any suicidal/homicidal ideation. Emphasizes that this incident was not a suicide attempt. In the ED, patient was hypertensive /111, tachycardic , afebrile. Initial laboratory work-up significant for ALT/AST 48/27, acetaminophen 6, WBC 14.5. Initial work-up included:       EKG showing normal sinus rhythm    Patient was given 1 L fluid bolus and started on acetylcysteine infusion.   Decision was made to admit patient to intermediate ICU for management of possible acute Tylenol overdose. Past Medical History:     Past Medical History:   Diagnosis Date    Depression         Past SurgicalHistory:     History reviewed. No pertinent surgical history. Medications Prior to Admission:     Prior to Admission medications    Medication Sig Start Date End Date Taking? Authorizing Provider   buPROPion (WELLBUTRIN XL) 300 MG extended release tablet Take 1 tablet by mouth every morning 21   CLIFTON Barry CNP   buPROPion [facility]) 150 MG extended release tablet Take 1 tablet by mouth 2 times daily 21   CLIFTON Barry CNP   pantoprazole (PROTONIX) 20 MG tablet Take 1 tablet by mouth nightly 21   CLIFTON Barry CNP        Allergies:     Patient has no known allergies. Social History:     Tobacco:    reports that he has been smoking cigars and cigarettes. He has a 5.00 pack-year smoking history. He has never used smokeless tobacco.  Alcohol:      reports current alcohol use. Drug Use:  reports no history of drug use. Family History:     History reviewed. No pertinent family history. Review of Systems:     Review of Systems   Constitutional: Negative for chills and fever. Respiratory: Negative for cough, chest tightness and shortness of breath. Cardiovascular: Negative for chest pain and palpitations. Gastrointestinal: Negative for abdominal distention and abdominal pain. Genitourinary: Negative for difficulty urinating and dysuria. Musculoskeletal: Positive for back pain (Chronic). Neurological: Negative for light-headedness and headaches. Psychiatric/Behavioral: Negative for agitation and behavioral problems. Physical Exam:   BP (!) 142/85   Pulse 76   Temp 98 °F (36.7 °C) (Oral)   Resp 14   Ht 5' 11\" (1.803 m)   Wt 157 lb 10.1 oz (71.5 kg)   SpO2 98%   BMI 21.98 kg/m²   Temp (24hrs), Av.1 °F (36.7 °C), Min:98 °F (36.7 °C), Max:98.2 °F (36.8 °C)    No results for input(s): POCGLU in the last 72 hours.     Intake/Output Summary (Last 24 hours) at 3/7/2022 0839  Last data filed at 3/7/2022 0520  Gross per 24 hour   Intake --   Output 250 ml   Net -250 ml       Physical Exam  Constitutional:       Appearance: Normal appearance. HENT:      Head: Normocephalic and atraumatic. Eyes:      Extraocular Movements: Extraocular movements intact. Conjunctiva/sclera: Conjunctivae normal.   Cardiovascular:      Rate and Rhythm: Normal rate and regular rhythm. Heart sounds: Normal heart sounds. Pulmonary:      Effort: Pulmonary effort is normal. No respiratory distress. Breath sounds: Normal breath sounds. Abdominal:      General: Abdomen is flat. There is no distension. Palpations: Abdomen is soft. Tenderness: There is no abdominal tenderness. Musculoskeletal:         General: Normal range of motion. Right lower leg: No edema. Left lower leg: No edema. Skin:     General: Skin is warm. Neurological:      General: No focal deficit present. Mental Status: He is alert and oriented to person, place, and time. Psychiatric:         Mood and Affect: Mood normal.         Behavior: Behavior normal.       Investigations:     Laboratory Testing:  Recent Results (from the past 24 hour(s))   EKG 12 Lead    Collection Time: 03/06/22 11:37 PM   Result Value Ref Range    Ventricular Rate 94 BPM    Atrial Rate 94 BPM    P-R Interval 160 ms    QRS Duration 104 ms    Q-T Interval 352 ms    QTc Calculation (Bazett) 440 ms    P Axis 60 degrees    R Axis 65 degrees    T Axis 67 degrees   COVID-19, Rapid    Collection Time: 03/07/22 12:18 AM    Specimen: Nasopharyngeal Swab   Result Value Ref Range    Specimen Description . NASOPHARYNGEAL SWAB     SARS-CoV-2, Rapid Not Detected Not Detected       Imaging/Diagnostics:  No results found.     Assessment :      Primary Problem  Tylenol overdose, intentional self-harm, initial encounter [facility])    Active Hospital Problems    Diagnosis Date Noted    Tylenol overdose, intentional self-harm, initial encounter ([facility] Kristy Harvey 03/07/2022    Depression [T27. A] 03/07/2022    Lumbar degenerative disc disease [M51.36] 01/24/2021    Heavy smoker [F17.200] 11/17/2020     Plan:     Patient status Admit as inpatient in the  Medical ICU    Acetaminophen overdose, intentional vs unintentional  - Patient ingested approximately 3500 mg acetaminophen over the course of 7 hours  - Acetylcysteine infusion in process  - Suicide precautions, bedside sitter in place    Comorbid conditions  Lumbar DDD: Lidocaine patch 4%  Nicotine dependence: Nicotine patch 21 mg/day    DVT prophylaxis: Lovenox 40 mg daily  GI prophylaxis: None  Code: Full  Dispo: Likely transfer out of ICU today    Consultations:   IP CONSULT TO FAMILY MEDICINE    Patient is admitted as inpatient status because of co-morbidities listed above, severity of signs and symptoms as outlined, requirement for current medical therapies and most importantly because of direct risk to patient if care not provided in a hospital setting. Joe Lima MD  3/7/2022  8:39 AM  Attending Physician Statement    I have discussed the case of Jodie Samaniego, including pertinent history and exam findings with the resident. I have seen and examined the patient and the key elements of the encounter have been performed by me. I agree with the assessment, plan, and orders as documented by the resident. He had a slight elevation in ALT and AST which came back to normal.  He took a moderate dose of acetaminophen which was about 3 to 3.5 g. On my examination he was alert and responsive with no confusion and he has no evidence of right upper quadrant tenderness to suggest acute hepatomegaly.   Time more than 30 minutes  Electronically signed by Ольга Casey MD on 3/7/2022 at 4:46 PM

## 2022-03-08 VITALS
DIASTOLIC BLOOD PRESSURE: 76 MMHG | HEART RATE: 78 BPM | BODY MASS INDEX: 22.07 KG/M2 | OXYGEN SATURATION: 98 % | HEIGHT: 71 IN | SYSTOLIC BLOOD PRESSURE: 128 MMHG | RESPIRATION RATE: 18 BRPM | WEIGHT: 157.63 LBS | TEMPERATURE: 98.5 F

## 2022-03-08 LAB
ABSOLUTE EOS #: 0.1 K/UL (ref 0–0.4)
ABSOLUTE LYMPH #: 2.4 K/UL (ref 1–4.8)
ABSOLUTE MONO #: 0.9 K/UL (ref 0.1–1.3)
ALBUMIN SERPL-MCNC: 3.5 G/DL (ref 3.5–5.2)
ALP BLD-CCNC: 56 U/L (ref 40–129)
ALT SERPL-CCNC: 47 U/L (ref 5–41)
ANION GAP SERPL CALCULATED.3IONS-SCNC: 10 MMOL/L (ref 9–17)
AST SERPL-CCNC: 28 U/L
BASOPHILS # BLD: 1 % (ref 0–2)
BASOPHILS ABSOLUTE: 0.1 K/UL (ref 0–0.2)
BILIRUB SERPL-MCNC: 0.47 MG/DL (ref 0.3–1.2)
BUN BLDV-MCNC: 8 MG/DL (ref 6–20)
CALCIUM SERPL-MCNC: 8.1 MG/DL (ref 8.6–10.4)
CHLORIDE BLD-SCNC: 109 MMOL/L (ref 98–107)
CO2: 22 MMOL/L (ref 20–31)
CREAT SERPL-MCNC: 0.52 MG/DL (ref 0.7–1.2)
EOSINOPHILS RELATIVE PERCENT: 1 % (ref 0–4)
GFR AFRICAN AMERICAN: >60 ML/MIN
GFR NON-AFRICAN AMERICAN: >60 ML/MIN
GFR SERPL CREATININE-BSD FRML MDRD: ABNORMAL ML/MIN/{1.73_M2}
GLUCOSE BLD-MCNC: 95 MG/DL (ref 70–99)
HCT VFR BLD CALC: 46.1 % (ref 41–53)
HEMOGLOBIN: 15.7 G/DL (ref 13.5–17.5)
LYMPHOCYTES # BLD: 22 % (ref 24–44)
MCH RBC QN AUTO: 28.9 PG (ref 26–34)
MCHC RBC AUTO-ENTMCNC: 34 G/DL (ref 31–37)
MCV RBC AUTO: 85 FL (ref 80–100)
MONOCYTES # BLD: 8 % (ref 1–7)
PDW BLD-RTO: 13.9 % (ref 11.5–14.9)
PLATELET # BLD: 221 K/UL (ref 150–450)
PMV BLD AUTO: 7 FL (ref 6–12)
POTASSIUM SERPL-SCNC: 3.7 MMOL/L (ref 3.7–5.3)
RBC # BLD: 5.43 M/UL (ref 4.5–5.9)
SEG NEUTROPHILS: 68 % (ref 36–66)
SEGMENTED NEUTROPHILS ABSOLUTE COUNT: 7.3 K/UL (ref 1.3–9.1)
SODIUM BLD-SCNC: 141 MMOL/L (ref 135–144)
TOTAL PROTEIN: 5.5 G/DL (ref 6.4–8.3)
WBC # BLD: 10.8 K/UL (ref 3.5–11)

## 2022-03-08 PROCEDURE — 85025 COMPLETE CBC W/AUTO DIFF WBC: CPT

## 2022-03-08 PROCEDURE — 2580000003 HC RX 258: Performed by: EMERGENCY MEDICINE

## 2022-03-08 PROCEDURE — 36415 COLL VENOUS BLD VENIPUNCTURE: CPT

## 2022-03-08 PROCEDURE — 6360000002 HC RX W HCPCS: Performed by: EMERGENCY MEDICINE

## 2022-03-08 PROCEDURE — 99238 HOSP IP/OBS DSCHRG MGMT 30/<: CPT | Performed by: INTERNAL MEDICINE

## 2022-03-08 PROCEDURE — 6370000000 HC RX 637 (ALT 250 FOR IP)

## 2022-03-08 PROCEDURE — 80053 COMPREHEN METABOLIC PANEL: CPT

## 2022-03-08 RX ADMIN — SODIUM CHLORIDE, PRESERVATIVE FREE 10 ML: 5 INJECTION INTRAVENOUS at 08:11

## 2022-03-08 RX ADMIN — ENOXAPARIN SODIUM 40 MG: 100 INJECTION SUBCUTANEOUS at 08:07

## 2022-03-08 ASSESSMENT — ENCOUNTER SYMPTOMS
ABDOMINAL DISTENTION: 0
SHORTNESS OF BREATH: 0
CHEST TIGHTNESS: 0
ABDOMINAL PAIN: 0
COUGH: 0
BACK PAIN: 1

## 2022-03-08 ASSESSMENT — PAIN SCALES - GENERAL: PAINLEVEL_OUTOF10: 4

## 2022-03-08 NOTE — PROGRESS NOTES
[address] [facility] [employer]    PROGRESS NOTE             3/8/2022    7:30 AM    Name:   Lexi Nichole  MRN:     [id number]     Acct:      [de-identified]   Room:   2051/2051-01   Day:  1  Admit Date:  3/6/2022 11:33 PM    PCP:  No primary care provider on file. Code Status:  Full Code    Subjective:     C/C:   Chief Complaint   Patient presents with    Drug Overdose     Interval History Status: not changed. Patient seen and examined at bedside this morning. No acute events overnight, patient reports that right upper quadrant abdominal pain has resolved and that he is feeling well. Poison control contacted night of 3/7 with AST/ALT/acetaminophen level results, recommended no further acetylcysteine administration at this time. Anticipate discharge likely later today. Review of Systems:     Review of Systems   Constitutional: Negative for chills and fever. Respiratory: Negative for cough, chest tightness and shortness of breath. Cardiovascular: Negative for chest pain and palpitations. Gastrointestinal: Negative for abdominal distention and abdominal pain. Genitourinary: Negative for difficulty urinating and dysuria. Musculoskeletal: Positive for back pain (Chronic). Neurological: Negative for light-headedness and headaches. Psychiatric/Behavioral: Negative for agitation and behavioral problems. Medications:      Allergies:  No Known Allergies    Current Meds:   Scheduled Meds:    sodium chloride flush  5-40 mL IntraVENous 2 times per day    enoxaparin  40 mg SubCUTAneous Daily    nicotine  1 patch TransDERmal Daily    lidocaine  1 patch TransDERmal Daily     Continuous Infusions:    sodium chloride       PRN Meds: sodium chloride flush, sodium chloride, ondansetron **OR** ondansetron, potassium chloride **OR** potassium alternative oral replacement **OR** potassium chloride, magnesium sulfate    Data:     Past Medical History: has a past medical history of Depression. Social History:   reports that he has been smoking cigars and cigarettes. He has a 5.00 pack-year smoking history. He has never used smokeless tobacco. He reports current alcohol use. He reports that he does not use drugs. Family History: History reviewed. No pertinent family history. Vitals:  /75   Pulse 74   Temp 98.1 °F (36.7 °C)   Resp 18   Ht 5' 11\" (1.803 m)   Wt 157 lb 10.1 oz (71.5 kg)   SpO2 96%   BMI 21.98 kg/m²   Temp (24hrs), Av.3 °F (36.8 °C), Min:97.6 °F (36.4 °C), Max:98.9 °F (37.2 °C)    No results for input(s): POCGLU in the last 72 hours. I/O(24Hr): Intake/Output Summary (Last 24 hours) at 3/8/2022 0730  Last data filed at 3/7/2022 1753  Gross per 24 hour   Intake 1150.16 ml   Output --   Net 1150.16 ml       Labs:  [unfilled]    Lab Results   Component Value Date/Time    SPECIAL  10/13/2013 05:05 PM     RIGHT ARM 6 CC Performed at [address] [place] [address] [phone] 10/13/2013 05:05 PM     Lab Results   Component Value Date/Time    CULTURE NO GROWTH 6 DAYS 10/13/2013 05:05 PM    CULTURE  10/13/2013 05:05 PM     Performed at [address] [place] [address] [phone]       [facility]    Radiology:    No results found. Physical Examination:        Physical Exam  Constitutional:       Appearance: Normal appearance. HENT:      Head: Normocephalic and atraumatic. Eyes:      Extraocular Movements: Extraocular movements intact. Conjunctiva/sclera: Conjunctivae normal.   Cardiovascular:      Rate and Rhythm: Normal rate and regular rhythm. Heart sounds: Normal heart sounds. Pulmonary:      Effort: Pulmonary effort is normal. No respiratory distress. Breath sounds: Normal breath sounds. Abdominal:      General: Abdomen is flat. There is no distension. Palpations: Abdomen is soft. Tenderness:  There is no abdominal tenderness. Musculoskeletal:         General: Normal range of motion. Right lower leg: No edema. Left lower leg: No edema. Skin:     General: Skin is warm. Neurological:      General: No focal deficit present. Mental Status: He is alert and oriented to person, place, and time. Psychiatric:         Mood and Affect: Mood normal.         Behavior: Behavior normal.       Assessment:        Primary Problem  Tylenol overdose, intentional self-harm, initial encounter [facility])    Active Hospital Problems    Diagnosis Date Noted    Tylenol overdose, intentional self-harm, initial encounter ([facility] Kristy Wes 03/07/2022    Depression [S76. A] 03/07/2022    Intentional acetaminophen overdose ([facility] [T39.1X2A]     Lumbar degenerative disc disease [M51.36] 01/24/2021    Heavy smoker [F17.200] 11/17/2020       Plan:        Acetaminophen overdose, unintentional  - Patient ingested approximately 3500 mg acetaminophen over the course of 7 hours  - Acetylcysteine infusion completed  - Suicide precautions, bedside sitter in place     Comorbid conditions  Lumbar DDD: Lidocaine patch 4%  Nicotine dependence: Nicotine patch 21 mg/day     DVT prophylaxis: Lovenox 40 mg daily  GI prophylaxis: None  Code: Full  Dispo: Home today 3/8    Joe Lima MD  3/8/2022  7:30 AM

## 2022-03-08 NOTE — DISCHARGE SUMMARY
[address]    Discharge Summary     Patient ID: Grace Carroll  :  1980   MRN: [id number]     ACCOUNT:  [de-identified]   Patient's PCP: No primary care provider on file. Admit Date: 3/6/2022   Discharge Date: 3/8/2022   Length of Stay: 1  Code Status:  Prior  Admitting Physician: Sebastian Hopper MD  Discharge Physician: Analisa Luciano MD     Active Discharge Diagnoses:       Primary Problem  Acetaminophen overdose of undetermined intent      [place] Problems    Diagnosis Date Noted    [facility] overdose, [facility], [facility] Ionfferkamla Campbell 2022    Depression [F32. A] 2022    Acetaminophen overdose of undetermined intent [T39.1X4A]     Lumbar degenerative disc disease [M51.36] 2021    Heavy smoker [F17.200] 2020       Admission Condition:  fair     Discharged Condition: good    Hospital Stay:       Hospital Course:  Grace Carroll is a 58-year-old male history of depression and slipped lumbar disc who presented with a possible Tylenol overdose. He was determined that the patient had ingested 3500 mg of Tylenol over the course of 5 to 7 hours to alleviate chronic lower back pain, and experienced right upper quadrant abdominal pain afterwards. Patient was admitted to the ICU, where he received acetylcysteine infusion and IV hydration. ALT/AST remained mildly elevated and steady over the course of the admission. Acetaminophen level was 6. Patient denied any suicidal ideation associated with the incident. At the time of discharge patient is clinically and vitally stable.     Significant therapeutic interventions: Acetylcysteine infusion, IV hydration    Significant Diagnostic Studies:   Labs / Micro:  CBC:   Lab Results   Component Value Date    WBC 10.8 2022    RBC 5.43 2022    HGB 15.7 2022    HCT 46.1 2022    MCV 85.0 2022 Jodie Samaniego, including pertinent history and exam findings with the resident. I have seen and examined the patient and the key elements of the encounter have been performed by me. I agree with the assessment, plan, and orders as documented by the resident.   Patient is stable and can be discharged, he was asymptomatic at the time of discharge  Electronically signed by Ольга Casey MD on 3/8/2022 at 6:16 PM

## 2022-03-08 NOTE — PROGRESS NOTES
Patient transferred to [place] [address] room 2051 via wheelchair at this time. All personal belongings sent with patient.

## 2022-03-08 NOTE — PROGRESS NOTES
Patient to be transferred to room 2051 this morning. Report given to Nicki Ramos RN from Med Surg at this time.

## 2022-03-08 NOTE — DISCHARGE INSTR - COC
Continuity of Care Form    Patient Name: Nena Hendricks   :  1980  MRN:  [id number]    Admit date:  3/6/2022  Discharge date:  ***    Code Status Order: Full Code   Advance Directives:      Admitting Physician:  Mack Stock MD  PCP: No primary care provider on file. Discharging Nurse: [facility] Unit/Room#:   Discharging Unit Phone Number: ***    Emergency Contact:   Extended Emergency Contact Information  Primary Emergency Contact: Elvira Worthy  Address: [facility], [address] [place] [address]  Home Phone: [phone]  Relation: Parent    Past Surgical History:  History reviewed. No pertinent surgical history. 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 A    Intentional acetaminophen overdose (HCC) T39.1X2A       Isolation/Infection:   Isolation            No Isolation          Patient Infection Status       None to display            Nurse Assessment:  Last Vital Signs: /75   Pulse 74   Temp 98.1 °F (36.7 °C)   Resp 18   Ht 5' 11\" (1.803 m)   Wt 157 lb 10.1 oz (71.5 kg)   SpO2 96%   BMI 21.98 kg/m²     Last documented pain score (0-10 scale): Pain Level: 4  Last Weight:   Wt Readings from Last 1 Encounters:   22 157 lb 10.1 oz (71.5 kg)     Mental Status:  {IP PT MENTAL STATUS:}    IV Access:  { WILFRED IV ACCESS:101058394}    Nursing Mobility/ADLs:  Walking   {CHP DME KMKX:917990235}  Transfer  {CHP DME ESJU:671303482}  Bathing  {CHP DME IATH:254785492}  Dressing  {CHP DME YUUN:286363520}  Toileting  {CHP DME CEFL:492648899}  Feeding  {CHP DME MLYJ:987551875}  Med Admin  {CHP DME XFTO:041857333}  Med Delivery   { WILFRED MED Delivery:170016188}    Wound Care Documentation and Therapy:        Elimination:  Continence: Bowel: {YES / BA:38177}  Bladder: {YES / HF:11776}  Urinary Catheter: {Urinary Catheter:916316302}   Colostomy/Ileostomy/Ileal Conduit: {YES / FH:87846}       Date of Last BM: ***    Intake/Output Summary (Last 24 hours) at 3/8/2022 1148  Last data filed at 3/7/2022 1753  Gross per 24 hour   Intake 910.16 ml   Output --   Net 910.16 ml     I/O last 3 completed shifts:   In: 1150.2 [P.O.:480; IV Piggyback:670.2]  Out: 250 [Urine:250]    Safety Concerns:     [employer] Safety Concerns:816740363}    Impairments/Disabilities:      [employer] Impairments/Disabilities:797516460}    Nutrition Therapy:  Current Nutrition Therapy:   [employer] Diet List:251339703}    Routes of Feeding: {CHP DME Other Feedings:065664572}  Liquids: {Slp liquid thickness:95459}  Daily Fluid Restriction: {CHP DME Yes amt example:156242643}  Last Modified Barium Swallow with Video (Video Swallowing Test): {Done Not Done TWDF:102947789}    Treatments at the Time of Hospital Discharge:   Respiratory Treatments: ***  Oxygen Therapy:  {Therapy; copd oxygen:52035}  Ventilator:    { CC Vent ZWDZ:899703377}    Rehab Therapies: {THERAPEUTIC INTERVENTION:6301045749}  Weight Bearing Status/Restrictions: Pebbles JIMENEZ Weight Bearin}  Other Medical Equipment (for information only, NOT a DME order):  {EQUIPMENT:493353159}  Other Treatments: ***    Patient's personal belongings (please select all that are sent with patient):  {CHMORRIS DME Belongings:359877755}    RN SIGNATURE:  {Esignature:667914490}    CASE MANAGEMENT/SOCIAL WORK SECTION    Inpatient Status Date: ***    Readmission Risk Assessment Score:  Readmission Risk              Risk of Unplanned Readmission:  8           Discharging to Facility/ Agency   Name:   Address:  Phone:  Fax:    Dialysis Facility (if applicable)   Name:  Address:  Dialysis Schedule:  Phone:  Fax:    / signature: {Esignature:179028122}    PHYSICIAN SECTION    Prognosis: {Prognosis:0363410902}    Condition at Discharge: [address] [place] [address] Patient Condition:171795623}    Rehab Potential (if transferring to Rehab): {Prognosis:9732698321}    Recommended Labs or Other Treatments After Discharge: ***    Physician Certification: I certify the above information and transfer of Lexi Nichole  is necessary for the continuing treatment of the diagnosis listed and that he requires {Admit to Appropriate Level of Care:90136} for {GREATER/LESS:752480373} 30 days.      Update Admission H&P: {CHP DME Changes in NKNWB:047035707}    PHYSICIAN SIGNATURE:  {Esignature:240738355}

## 2022-03-08 NOTE — PROGRESS NOTES
Poison Control calls at this time for most updated AST/ALT/Acetaminophen lab results. Writer provided these results and per poison control, no further Acetylcysteine will be needed at this time.

## 2022-03-08 NOTE — PROGRESS NOTES
Pt removed IV his self, writer made sure they were removed. Which I educated him on the risk of that. Writer gave pt discharge paperwork, pt understand and states no further questions. Pt wants to walk down to his car in the ER.

## 2022-03-08 NOTE — FLOWSHEET NOTE
03/08/22 1118   Encounter Summary   Services provided to: Patient   Referral/Consult From: Rounding   Continue Visiting   (3-8-22)   Complexity of Encounter Low   Length of Encounter 15 minutes   Spiritual Assessment Completed Yes   Spiritual/Pentecostal   Type Spiritual support   Assessment Calm; Approachable;Coping   Intervention Active listening;Explored feelings, thoughts, concerns;Prayer;Sustaining presence/ Ministry of presence   Outcome Expressed gratitude;Engaged in conversation;Coping

## 2022-03-08 NOTE — CARE COORDINATION
ONGOING DISCHARGE PLAN:    Patient is alert and oriented x4. Spoke with patient regarding discharge plan and patient confirms that plan is still to return to home alone. Denies VNS/Needs. Sitter at the bedside. Follows w/ Dr. Cortes Garcia, at [facility]. Anticipate DC today w/ no needs. Will continue to follow for additional discharge needs.     Electronically signed by Wong Conner RN on 3/8/2022 at 10:49 AM

## 2022-03-08 NOTE — PROGRESS NOTES
Carlos Boyer called to check in on pt however he was not on the emergency contact list so writer went into the room to ask Doc Guerra if it was okay that I give information to B[place]. Doc Guerra states it was okay to give [employer].

## 2022-03-09 ENCOUNTER — CARE COORDINATION (OUTPATIENT)
Dept: CASE MANAGEMENT | Age: 42
End: 2022-03-09

## 2022-03-09 DIAGNOSIS — T39.1X4A ACETAMINOPHEN OVERDOSE OF UNDETERMINED INTENT, INITIAL ENCOUNTER: Primary | ICD-10-CM

## 2022-03-09 NOTE — CARE COORDINATION
[facility] Transitions Initial Follow Up Call    Call within 2 business days of discharge: Yes    Patient: Lulu Cruz Patient : 1980   MRN: <[id number]>  Reason for Admission: ntentional acetaminophen overdose, initial encounter   Discharge Date: 3/8/22 RARS: Readmission Risk Score: 6.4 ( )      Last Discharge [facility]       Complaint Diagnosis Description Type Department Provider    3/6/22 Drug Overdose Intentional acetaminophen overdose, initial encounter [facility]) ED to Hosp-Admission (Discharged) (ADMITTED) Beverly Bah MD; Rob Lozada. Spoke with: Maya Arin he states he is doing okay , patient speaks with a somber tone denies abdominal pain, Chest pain, SOB, dizziness, depression. Is eating and drinking well. Normal bowel and bladder elimination. Medications reviewed and patient taking as directed. Denies concerns was reminded of Future New Patient appointment. Has no concerns    Facility: [facility]    Non-face-to-face services provided:  Obtained and reviewed discharge summary and/or continuity of care documents  Transitions of Care Initial Call    Was this an external facility discharge? No     Challenges to be reviewed by the provider   Additional needs identified to be addressed with provider: No  none             Method of communication with provider : none      Advance Care Planning:   Does patient have an Advance Directive: reviewed and current, reviewed and needs to be updated, not on file; education provided, patient declined education, decision maker updated and referral to internal ACP facilitator. Was this a readmission? No  Patient stated reason for admission: abdominal pain after taking large amount of Tylenol  Patients top risk factors for readmission: depression  lack of knowledge about disease  medication management  PCP relationship    Care Transition Nurse (CTN) contacted the patient by telephone to perform post hospital discharge assessment. Verified name and  with patient as identifiers. Provided introduction to self, and explanation of the CTN role. CTN reviewed discharge instructions, medical action plan and red flags with patient who verbalized understanding. Patient given an opportunity to ask questions and does not have any further questions or concerns at this time. Were discharge instructions available to patient? Yes. Reviewed appropriate site of care based on symptoms and resources available to patient including: PCP and Specialist. The patient agrees to contact the PCP office for questions related to their healthcare. Medication reconciliation was performed with family, who verbalizes understanding of administration of home medications. Advised obtaining a 90-day supply of all daily and as-needed medications. Covid Risk Education     Educated patient about risk for severe COVID-19 due to risk factors according to CDC guidelines. LPN CC reviewed discharge instructions, medical action plan and red flag symptoms with the patient who verbalized understanding. Discussed COVID vaccination status: Yes. Education provided on COVID-19 vaccination as appropriate. Discussed exposure protocols and quarantine with CDC Guidelines. Patient was given an opportunity to verbalize any questions and concerns and agrees to contact LPN CC or health care provider for questions related to their healthcare. Reviewed and educated patient on any new and changed medications related to discharge diagnosis. Was patient discharged with a pulse oximeter? No Discussed and confirmed pulse oximeter discharge instructions and when to notify provider or seek emergency care. LPN CC provided contact information. Plan for follow-up call in 5-7 days based on severity of symptoms and risk factors.   Plan for next call: symptom management-depression pain      Care Transitions 24 Hour Call    Do you have any ongoing symptoms?: No  Do you have a copy of your discharge instructions?: Yes  Do you have all of your prescriptions and are they filled?: Yes  Have you been contacted by a [email or web address]?: No  Have you scheduled your follow up appointment?: Yes  How are you going to get to your appointment?: Car - drive self  Were you discharged with any Home Care or Post Acute Services: No  Do you feel like you have everything you need to keep you well at home?: Yes  Care Transitions Interventions         Follow Up  Future Appointments   Date Time Provider Sherwin May   3/21/2022 12:45 PM Leeanne Angel, [address] [facility]

## 2022-03-16 ENCOUNTER — CARE COORDINATION (OUTPATIENT)
Dept: CASE MANAGEMENT | Age: 42
End: 2022-03-16

## 2022-03-16 NOTE — CARE COORDINATION
[facility] Transitions Follow Up Call    3/16/2022    Patient: Suzanne Everett  Patient : 1980   MRN: <[id number]>  Reason for Admission:   Discharge Date: 3/8/22 RARS: Readmission Risk Score: 6.4 ( )         Spoke with: Called to speak with patient for update with transition of care. Left HIPPA compliant voice message with contact information [phone] for a call  Back with an update. Care Transitions Subsequent and Final Call    Subsequent and Final Calls  Care Transitions Interventions  Other Interventions:            Follow Up  Future Appointments   Date Time Provider Sherwin May   3/21/2022 12:45 PM Robinson Peck [address]       Xenia Haskins LPN

## 2022-03-17 ENCOUNTER — CARE COORDINATION (OUTPATIENT)
Dept: CASE MANAGEMENT | Age: 42
End: 2022-03-17

## 2022-03-17 NOTE — CARE COORDINATION
[facility] Transitions Follow Up Call    3/17/2022    Patient: Sacha Thakkar  Patient : 1980   MRN: <[id number]>  Reason for Admission: untentional acetaminophen overdose, initial encounter   Discharge Date: 3/8/22 RARS: Readmission Risk Score: 6.4 ( )         Spoke with: Called to speak with patient for update with transition of care. Left HIPPA compliant voice message with contact information [phone] for a call  Back with an update. Care Transitions Subsequent and Final Call    Subsequent and Final Calls  Care Transitions Interventions  Other Interventions:            Follow Up  Future Appointments   Date Time Provider Sherwin May   3/21/2022 12:45 PM Robinson Velasco [address]       Mary Ewing LPN